# Patient Record
Sex: MALE | Race: WHITE | NOT HISPANIC OR LATINO | URBAN - METROPOLITAN AREA
[De-identification: names, ages, dates, MRNs, and addresses within clinical notes are randomized per-mention and may not be internally consistent; named-entity substitution may affect disease eponyms.]

---

## 2023-08-31 ENCOUNTER — OFFICE (OUTPATIENT)
Dept: URBAN - METROPOLITAN AREA CLINIC 16 | Facility: CLINIC | Age: 33
End: 2023-08-31
Payer: COMMERCIAL

## 2023-08-31 VITALS
WEIGHT: 247 LBS | HEART RATE: 77 BPM | DIASTOLIC BLOOD PRESSURE: 78 MMHG | SYSTOLIC BLOOD PRESSURE: 114 MMHG | HEIGHT: 69 IN | OXYGEN SATURATION: 97 %

## 2023-08-31 DIAGNOSIS — R19.4 CHANGE IN BOWEL HABIT: ICD-10-CM

## 2023-08-31 DIAGNOSIS — K90.0 CELIAC DISEASE: ICD-10-CM

## 2023-08-31 DIAGNOSIS — R10.9 UNSPECIFIED ABDOMINAL PAIN: ICD-10-CM

## 2023-08-31 DIAGNOSIS — Z83.79 FAMILY HISTORY OF OTHER DISEASES OF THE DIGESTIVE SYSTEM: ICD-10-CM

## 2023-08-31 DIAGNOSIS — K62.5 HEMORRHAGE OF ANUS AND RECTUM: ICD-10-CM

## 2023-08-31 PROCEDURE — 99204 OFFICE O/P NEW MOD 45 MIN: CPT | Performed by: INTERNAL MEDICINE

## 2023-08-31 NOTE — SERVICENOTES
Braulio will have TTG and colonoscopy with extended prep

Impressions:
 He has had a change in bowels with even constipation with a family history of Crohn's and ready to have evaluation. Will have colonoscopy with extended prep. He does have a family history of celiac and gluten sensitivity as well and we will check TTG as for completeness
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
## 2023-09-15 LAB — TISSUE TRANSGLUTAMINASE AB, IGA: <4

## 2023-09-28 ENCOUNTER — AMBULATORY SURGICAL CENTER (OUTPATIENT)
Dept: URBAN - METROPOLITAN AREA SURGERY 5 | Facility: SURGERY | Age: 33
End: 2023-09-28
Payer: COMMERCIAL

## 2023-09-28 ENCOUNTER — OFFICE (OUTPATIENT)
Dept: URBAN - METROPOLITAN AREA PATHOLOGY 1 | Facility: PATHOLOGY | Age: 33
End: 2023-09-28
Payer: COMMERCIAL

## 2023-09-28 VITALS
RESPIRATION RATE: 13 BRPM | HEART RATE: 74 BPM | DIASTOLIC BLOOD PRESSURE: 82 MMHG | DIASTOLIC BLOOD PRESSURE: 67 MMHG | SYSTOLIC BLOOD PRESSURE: 118 MMHG | HEART RATE: 72 BPM | DIASTOLIC BLOOD PRESSURE: 86 MMHG | HEIGHT: 69 IN | DIASTOLIC BLOOD PRESSURE: 80 MMHG | RESPIRATION RATE: 17 BRPM | HEART RATE: 65 BPM | SYSTOLIC BLOOD PRESSURE: 119 MMHG | DIASTOLIC BLOOD PRESSURE: 74 MMHG | HEART RATE: 65 BPM | SYSTOLIC BLOOD PRESSURE: 121 MMHG | OXYGEN SATURATION: 97 % | HEART RATE: 72 BPM | SYSTOLIC BLOOD PRESSURE: 105 MMHG | WEIGHT: 235 LBS | SYSTOLIC BLOOD PRESSURE: 119 MMHG | HEART RATE: 74 BPM | DIASTOLIC BLOOD PRESSURE: 82 MMHG | HEART RATE: 70 BPM | DIASTOLIC BLOOD PRESSURE: 73 MMHG | HEART RATE: 62 BPM | SYSTOLIC BLOOD PRESSURE: 112 MMHG | RESPIRATION RATE: 19 BRPM | RESPIRATION RATE: 22 BRPM | DIASTOLIC BLOOD PRESSURE: 80 MMHG | HEART RATE: 62 BPM | RESPIRATION RATE: 13 BRPM | SYSTOLIC BLOOD PRESSURE: 118 MMHG | RESPIRATION RATE: 6 BRPM | DIASTOLIC BLOOD PRESSURE: 62 MMHG | SYSTOLIC BLOOD PRESSURE: 101 MMHG | HEIGHT: 69 IN | RESPIRATION RATE: 22 BRPM | SYSTOLIC BLOOD PRESSURE: 105 MMHG | DIASTOLIC BLOOD PRESSURE: 62 MMHG | DIASTOLIC BLOOD PRESSURE: 74 MMHG | DIASTOLIC BLOOD PRESSURE: 76 MMHG | TEMPERATURE: 97.3 F | HEART RATE: 77 BPM | SYSTOLIC BLOOD PRESSURE: 112 MMHG | OXYGEN SATURATION: 96 % | HEART RATE: 77 BPM | HEART RATE: 63 BPM | OXYGEN SATURATION: 97 % | DIASTOLIC BLOOD PRESSURE: 73 MMHG | OXYGEN SATURATION: 98 % | SYSTOLIC BLOOD PRESSURE: 121 MMHG | WEIGHT: 235 LBS | OXYGEN SATURATION: 98 % | RESPIRATION RATE: 17 BRPM | DIASTOLIC BLOOD PRESSURE: 67 MMHG | HEART RATE: 63 BPM | TEMPERATURE: 97.3 F | DIASTOLIC BLOOD PRESSURE: 86 MMHG | SYSTOLIC BLOOD PRESSURE: 101 MMHG | HEART RATE: 70 BPM | RESPIRATION RATE: 19 BRPM | RESPIRATION RATE: 6 BRPM | OXYGEN SATURATION: 96 % | DIASTOLIC BLOOD PRESSURE: 76 MMHG

## 2023-09-28 DIAGNOSIS — K63.5 POLYP OF COLON: ICD-10-CM

## 2023-09-28 DIAGNOSIS — K64.0 FIRST DEGREE HEMORRHOIDS: ICD-10-CM

## 2023-09-28 DIAGNOSIS — K62.5 HEMORRHAGE OF ANUS AND RECTUM: ICD-10-CM

## 2023-09-28 DIAGNOSIS — R19.4 CHANGE IN BOWEL HABIT: ICD-10-CM

## 2023-09-28 DIAGNOSIS — Z83.71 FAMILY HISTORY OF COLONIC POLYPS: ICD-10-CM

## 2023-09-28 PROCEDURE — 45380 COLONOSCOPY AND BIOPSY: CPT | Performed by: INTERNAL MEDICINE

## 2023-09-28 PROCEDURE — 88305 TISSUE EXAM BY PATHOLOGIST: CPT | Performed by: PATHOLOGY

## 2023-09-28 NOTE — SERVICENOTES
He will follow-up in the office in 6 to 8 weeks and he had examination for change in bowels discomfort in his abdomen family history of colon polyps and concern for family history of Crohn's.  He likely has hyperplastic polyps but exam no later than 10 years

## 2023-09-28 NOTE — SERVICEHPINOTES
He has had a change in bowels with even constipation with a family history of Crohn's and ready to have evaluation. Will have colonoscopy with extended prep.His dad had colon polyps as well

## 2023-10-03 LAB
PDF: PDF REPORT: (no result)
PDF: PDF REPORT: (no result)

## 2023-11-08 ENCOUNTER — OFFICE (OUTPATIENT)
Dept: URBAN - METROPOLITAN AREA CLINIC 16 | Facility: CLINIC | Age: 33
End: 2023-11-08

## 2023-11-08 VITALS — HEIGHT: 69 IN | HEART RATE: 73 BPM | WEIGHT: 250 LBS

## 2023-11-08 DIAGNOSIS — R19.4 CHANGE IN BOWEL HABIT: ICD-10-CM

## 2023-11-08 DIAGNOSIS — Z83.719 FAMILY HISTORY OF COLON POLYPS, UNSPECIFIED: ICD-10-CM

## 2023-11-08 DIAGNOSIS — Z83.79 FAMILY HISTORY OF OTHER DISEASES OF THE DIGESTIVE SYSTEM: ICD-10-CM

## 2023-11-08 PROCEDURE — 99214 OFFICE O/P EST MOD 30 MIN: CPT | Performed by: INTERNAL MEDICINE

## 2023-11-08 NOTE — SERVICEHPINOTES
Frank Powers   is seen today for a follow-up visit.     Braulio was seen in consultation back on August 31, 2023 and does have concern for underlying IBS with change in bowels towards constipation and some alarm concerned with rectal discomfort and rectal bleeding and plan for colonoscopy accordingly with extended prep. Associated with his change in bowels with some generalized discomfort as well he does have posttraumatic stress disorder ADHD migraines and anxiety and sleep apnea to be complicating he had also been on Depakote and venlafaxine in the pastI reviewed the colonoscopy from September 28, 2023 for constipation a family history of Crohn's and his dad had colon polyps as well and the colonoscopy revealed small polyps and plan for colonoscopy no later than 10 years. The ileum was biopsied which was negative for disease accordingly. His polyp specimen showed benign lymphoid aggregate
br
br
brWhen he used MiraLAX he had explosive diarrhea with his irritable bowel. He does have a lot of cramping and I will send in dicyclomine 20 mg 4 times a day. I will send in Trulance to see if this helps with constipation as well

## 2023-11-08 NOTE — SERVICENOTES
Braulio has IBS with constipation and rectal discomfort rectal pain in the setting of posttraumatic stress disorder and anxiety that would suggest underlying IBS issues.  He will have a colonoscopy no later than 10 years and have further treatment for IBS accordingly he had negative biopsies of the ileum.  When he used MiraLAX he had explosive diarrhea with his irritable bowel.  He does have a lot of cramping and I will send in dicyclomine 20 mg 4 times a day.  I will send in Trulance to see if this helps with constipation as well

## 2024-05-28 ENCOUNTER — OFFICE (OUTPATIENT)
Dept: URBAN - METROPOLITAN AREA CLINIC 16 | Facility: CLINIC | Age: 34
End: 2024-05-28
Payer: COMMERCIAL

## 2024-05-28 VITALS
OXYGEN SATURATION: 97 % | HEART RATE: 78 BPM | WEIGHT: 245 LBS | HEIGHT: 69 IN | SYSTOLIC BLOOD PRESSURE: 122 MMHG | DIASTOLIC BLOOD PRESSURE: 82 MMHG

## 2024-05-28 DIAGNOSIS — K58.2 MIXED IRRITABLE BOWEL SYNDROME: ICD-10-CM

## 2024-05-28 DIAGNOSIS — R19.4 CHANGE IN BOWEL HABIT: ICD-10-CM

## 2024-05-28 DIAGNOSIS — Z83.79 FAMILY HISTORY OF OTHER DISEASES OF THE DIGESTIVE SYSTEM: ICD-10-CM

## 2024-05-28 DIAGNOSIS — Z83.719 FAMILY HISTORY OF COLON POLYPS, UNSPECIFIED: ICD-10-CM

## 2024-05-28 PROBLEM — K63.5 POLYP OF COLON: Status: ACTIVE | Noted: 2023-09-28

## 2024-05-28 PROBLEM — Z83.71 FAMILY HISTORY OF COLONIC POLYPS: Status: ACTIVE | Noted: 2023-09-28

## 2024-05-28 PROCEDURE — 99214 OFFICE O/P EST MOD 30 MIN: CPT | Performed by: INTERNAL MEDICINE

## 2024-05-28 NOTE — SERVICEHPINOTES
Frank Powers   is seen today for a follow-up visit.     Braulio was seen in consultation back in August 2023 and has a history of IBS with constipation predominant symptomatology and alarm symptoms with rectal discomfort rectal bleeding and had colonoscopy with extended prep he does have posttraumatic stress disorder ADHD with anxiety and also sleep apnea. The colonoscopy in September 2023 revealed small polyps negative for adenoma with lymphoid aggregate and colonoscopy recommended no later than 10 years the ileum was biopsied and appeared unremarkable as wellRegardless for treatment he had MiraLAX and had diarrhea with irritable bowel and cramping and had to be started on dicyclomine and was recommended for Trulance to see if this helps with constipation as well
br
Pavel had negative biopsies as above but negative blood work on complete evaluation negative evaluation for celiac he does have abdominal cramping and this appears to be related to his IBS and even when he started Depakote he noted this issues with further cramping 
br
brFor the most part he is stable with his issues and will continue current treatment with further dietary modifications and do well visit in 6 months

## 2024-05-28 NOTE — SERVICENOTES
Braulio has posttraumatic stress disorder anxiety stress history and poor response to MiraLAX with diarrhea and cramping and explosion with bowels with constipation history.  He will have medical management for IBS and continuation of Bentyl.Definitely helps we will do further dietary measures to see if this helps with his irritable bowel but for the most part he is doing okay

## 2024-10-31 ENCOUNTER — TRANSCRIBE ORDERS (OUTPATIENT)
Dept: ADMINISTRATIVE | Age: 34
End: 2024-10-31

## 2024-10-31 DIAGNOSIS — G50.0 TRIGEMINAL NEURALGIA: Primary | ICD-10-CM

## 2024-11-15 RX ORDER — GABAPENTIN 100 MG/1
100 CAPSULE ORAL 2 TIMES DAILY
COMMUNITY
Start: 2024-11-06

## 2024-11-15 RX ORDER — ZONISAMIDE 100 MG/1
300 CAPSULE ORAL DAILY
COMMUNITY

## 2024-11-15 RX ORDER — DIVALPROEX SODIUM 500 MG/1
500 TABLET, DELAYED RELEASE ORAL DAILY
COMMUNITY

## 2024-11-15 RX ORDER — FLUTICASONE PROPIONATE 50 MCG
2 SPRAY, SUSPENSION (ML) NASAL DAILY
COMMUNITY

## 2024-11-15 RX ORDER — LISDEXAMFETAMINE DIMESYLATE 30 MG/1
30 CAPSULE ORAL DAILY
COMMUNITY
Start: 2024-03-14

## 2024-11-15 RX ORDER — ALLOPURINOL 100 MG/1
100 TABLET ORAL DAILY
COMMUNITY
Start: 2024-11-14

## 2024-11-15 RX ORDER — VENLAFAXINE 75 MG/1
112.5 TABLET ORAL DAILY
COMMUNITY

## 2024-11-15 RX ORDER — GABAPENTIN 100 MG/1
100 CAPSULE ORAL 3 TIMES DAILY
COMMUNITY
End: 2024-11-15

## 2024-11-15 RX ORDER — DICYCLOMINE HYDROCHLORIDE 10 MG/1
10 CAPSULE ORAL 3 TIMES DAILY PRN
COMMUNITY

## 2024-11-15 RX ORDER — CETIRIZINE HYDROCHLORIDE 10 MG/1
10 TABLET ORAL DAILY
COMMUNITY

## 2024-11-15 RX ORDER — IBUPROFEN 200 MG
200 TABLET ORAL 3 TIMES DAILY PRN
COMMUNITY

## 2024-12-06 ENCOUNTER — PRE-PROCEDURE TELEPHONE (OUTPATIENT)
Dept: RADIATION ONCOLOGY | Age: 34
End: 2024-12-06

## 2024-12-06 NOTE — PROGRESS NOTES
INSTRUCTIONS FOR THE DAY OF GAMMA KNIFE PROCEDURE AT [facility]    1.  Arrive at 6:00 a.m. to register.  2.  DO NOT eat or drink anything after midnight the night before your procedure.  3.  Take all of your usual morning medications the day of the procedure with just a sip of water.  4.  If you are on any blood thinners (Coumadin, Xarelto, Aspirin, Lovenox), you MAY TAKE those medication.  There is no need to stop these medications for your procedure.  5.  If you need pain medication during the night before or the morning of your procedure, you may take them with a sip of water.    6.  Bring a current list of all of your medications with you.  7.  Do not wear any make-up.  8.  Wear comfortable, loose-fitting clothing.  9.  Do not wear jewelry, belts, or any clothing that contains metal.  10.  Visitors will be limited to 1 per patient.    YOU MUST HAVE SOMEONE DRIVE YOU HOME AFTER YOUR PROCEDURE.    Reviewed all pre-procedure instructions with patient via telephone. Answered all questions.    Rachael Hong RN

## 2024-12-09 ENCOUNTER — HOSPITAL ENCOUNTER (OUTPATIENT)
Dept: RADIATION ONCOLOGY | Age: 34
Discharge: HOME | End: 2024-12-09
Attending: NEUROLOGICAL SURGERY

## 2024-12-09 ENCOUNTER — HOSPITAL ENCOUNTER (OUTPATIENT)
Dept: MRI IMAGING | Age: 34
Discharge: HOME | End: 2024-12-09
Attending: NEUROLOGICAL SURGERY
Payer: COMMERCIAL

## 2024-12-09 ENCOUNTER — ANESTHESIA EVENT (OUTPATIENT)
Dept: RADIATION ONCOLOGY | Age: 34
End: 2024-12-09
Payer: COMMERCIAL

## 2024-12-09 ENCOUNTER — ANESTHESIA (OUTPATIENT)
Dept: RADIATION ONCOLOGY | Age: 34
End: 2024-12-09
Payer: COMMERCIAL

## 2024-12-09 VITALS
SYSTOLIC BLOOD PRESSURE: 133 MMHG | BODY MASS INDEX: 33.79 KG/M2 | TEMPERATURE: 97.5 F | DIASTOLIC BLOOD PRESSURE: 92 MMHG | RESPIRATION RATE: 13 BRPM | OXYGEN SATURATION: 100 % | HEIGHT: 70 IN | WEIGHT: 236 LBS | HEART RATE: 69 BPM

## 2024-12-09 DIAGNOSIS — G50.0 TRIGEMINAL NEURALGIA: ICD-10-CM

## 2024-12-09 PROCEDURE — 3700000000 HC ANESTHESIA ATTENDED CARE: Performed by: ANESTHESIOLOGY

## 2024-12-09 PROCEDURE — 2500000003 HC RX 250 WO HCPCS: Performed by: NEUROLOGICAL SURGERY

## 2024-12-09 PROCEDURE — 77334 RADIATION TREATMENT AID(S): CPT

## 2024-12-09 PROCEDURE — 77300 RADIATION THERAPY DOSE PLAN: CPT

## 2024-12-09 PROCEDURE — A9579 GAD-BASE MR CONTRAST NOS,1ML: HCPCS | Performed by: NEUROLOGICAL SURGERY

## 2024-12-09 PROCEDURE — 6360000002 HC RX W HCPCS

## 2024-12-09 PROCEDURE — 77371 SRS MULTISOURCE: CPT

## 2024-12-09 PROCEDURE — 70553 MRI BRAIN STEM W/O & W/DYE: CPT

## 2024-12-09 PROCEDURE — 77295 3-D RADIOTHERAPY PLAN: CPT

## 2024-12-09 PROCEDURE — 6360000002 HC RX W HCPCS: Performed by: NEUROLOGICAL SURGERY

## 2024-12-09 PROCEDURE — 7100000011 HC PHASE II RECOVERY - ADDTL 15 MIN

## 2024-12-09 PROCEDURE — 7100000010 HC PHASE II RECOVERY - FIRST 15 MIN

## 2024-12-09 PROCEDURE — 3700000001 HC ADD 15 MINUTES (ANESTHESIA): Performed by: ANESTHESIOLOGY

## 2024-12-09 PROCEDURE — 2580000003 HC RX 258: Performed by: NEUROLOGICAL SURGERY

## 2024-12-09 PROCEDURE — 6360000004 HC RX CONTRAST MEDICATION: Performed by: NEUROLOGICAL SURGERY

## 2024-12-09 PROCEDURE — 6370000000 HC RX 637 (ALT 250 FOR IP): Performed by: NEUROLOGICAL SURGERY

## 2024-12-09 RX ORDER — ONDANSETRON 2 MG/ML
4 INJECTION INTRAMUSCULAR; INTRAVENOUS EVERY 6 HOURS PRN
Status: DISCONTINUED | OUTPATIENT
Start: 2024-12-09 | End: 2024-12-10 | Stop reason: HOSPADM

## 2024-12-09 RX ORDER — LIDOCAINE HYDROCHLORIDE 20 MG/ML
INJECTION, SOLUTION EPIDURAL; INFILTRATION; INTRACAUDAL; PERINEURAL
Status: DISCONTINUED | OUTPATIENT
Start: 2024-12-09 | End: 2024-12-09 | Stop reason: SDUPTHER

## 2024-12-09 RX ORDER — LORAZEPAM 2 MG/ML
INJECTION INTRAMUSCULAR
Status: COMPLETED
Start: 2024-12-09 | End: 2024-12-09

## 2024-12-09 RX ORDER — SODIUM BICARBONATE 42 MG/ML
1.5 INJECTION, SOLUTION INTRAVENOUS ONCE
Status: COMPLETED | OUTPATIENT
Start: 2024-12-09 | End: 2024-12-09

## 2024-12-09 RX ORDER — 0.9 % SODIUM CHLORIDE 0.9 %
500 INTRAVENOUS SOLUTION INTRAVENOUS ONCE
Status: COMPLETED | OUTPATIENT
Start: 2024-12-09 | End: 2024-12-09

## 2024-12-09 RX ORDER — BUPIVACAINE HYDROCHLORIDE 5 MG/ML
30 INJECTION, SOLUTION EPIDURAL; INTRACAUDAL ONCE
Status: COMPLETED | OUTPATIENT
Start: 2024-12-09 | End: 2024-12-09

## 2024-12-09 RX ORDER — NEOMYCIN/BACITRACIN/POLYMYXINB 3.5-400-5K
OINTMENT (GRAM) TOPICAL 2 TIMES DAILY
Status: DISCONTINUED | OUTPATIENT
Start: 2024-12-09 | End: 2024-12-10 | Stop reason: HOSPADM

## 2024-12-09 RX ORDER — DEXAMETHASONE SODIUM PHOSPHATE 4 MG/ML
4 INJECTION, SOLUTION INTRA-ARTICULAR; INTRALESIONAL; INTRAMUSCULAR; INTRAVENOUS; SOFT TISSUE ONCE
Status: COMPLETED | OUTPATIENT
Start: 2024-12-09 | End: 2024-12-09

## 2024-12-09 RX ORDER — LORAZEPAM 2 MG/ML
1 INJECTION INTRAMUSCULAR ONCE
Status: COMPLETED | OUTPATIENT
Start: 2024-12-09 | End: 2024-12-09

## 2024-12-09 RX ORDER — PROPOFOL 10 MG/ML
INJECTION, EMULSION INTRAVENOUS
Status: DISCONTINUED | OUTPATIENT
Start: 2024-12-09 | End: 2024-12-09 | Stop reason: SDUPTHER

## 2024-12-09 RX ORDER — ACETAMINOPHEN 325 MG/1
650 TABLET ORAL EVERY 4 HOURS PRN
Status: DISCONTINUED | OUTPATIENT
Start: 2024-12-09 | End: 2024-12-10 | Stop reason: HOSPADM

## 2024-12-09 RX ADMIN — LIDOCAINE HYDROCHLORIDE 100 MG: 20 INJECTION, SOLUTION EPIDURAL; INFILTRATION; INTRACAUDAL; PERINEURAL at 09:03

## 2024-12-09 RX ADMIN — LIDOCAINE HYDROCHLORIDE 30 ML: 10 INJECTION, SOLUTION EPIDURAL; INFILTRATION; INTRACAUDAL; PERINEURAL at 09:21

## 2024-12-09 RX ADMIN — PROPOFOL 70 MG: 10 INJECTION, EMULSION INTRAVENOUS at 09:03

## 2024-12-09 RX ADMIN — LORAZEPAM 1 MG: 2 INJECTION INTRAMUSCULAR; INTRAVENOUS at 13:09

## 2024-12-09 RX ADMIN — PROPOFOL 40 MG: 10 INJECTION, EMULSION INTRAVENOUS at 09:12

## 2024-12-09 RX ADMIN — LORAZEPAM 1 MG: 2 INJECTION INTRAMUSCULAR at 13:09

## 2024-12-09 RX ADMIN — ACETAMINOPHEN 650 MG: 325 TABLET ORAL at 14:01

## 2024-12-09 RX ADMIN — ONDANSETRON 4 MG: 2 INJECTION INTRAMUSCULAR; INTRAVENOUS at 08:56

## 2024-12-09 RX ADMIN — LIDOCAINE HYDROCHLORIDE 30 ML: 10 INJECTION, SOLUTION EPIDURAL; INFILTRATION; INTRACAUDAL; PERINEURAL at 11:32

## 2024-12-09 RX ADMIN — ACETAMINOPHEN 650 MG: 325 TABLET ORAL at 09:56

## 2024-12-09 RX ADMIN — PROPOFOL 50 MG: 10 INJECTION, EMULSION INTRAVENOUS at 09:05

## 2024-12-09 RX ADMIN — SODIUM BICARBONATE 1.5 MEQ: 42 INJECTION, SOLUTION INTRAVENOUS at 09:21

## 2024-12-09 RX ADMIN — GADOTERIDOL 20 ML: 279.3 INJECTION, SOLUTION INTRAVENOUS at 07:13

## 2024-12-09 RX ADMIN — DEXAMETHASONE SODIUM PHOSPHATE 4 MG: 4 INJECTION INTRA-ARTICULAR; INTRALESIONAL; INTRAMUSCULAR; INTRAVENOUS; SOFT TISSUE at 09:21

## 2024-12-09 RX ADMIN — SODIUM CHLORIDE 500 ML: 900 INJECTION, SOLUTION INTRAVENOUS at 09:22

## 2024-12-09 RX ADMIN — BUPIVACAINE HYDROCHLORIDE 150 MG: 5 INJECTION, SOLUTION EPIDURAL; INTRACAUDAL; PERINEURAL at 09:20

## 2024-12-09 RX ADMIN — PROPOFOL 40 MG: 10 INJECTION, EMULSION INTRAVENOUS at 09:08

## 2024-12-09 ASSESSMENT — PAIN DESCRIPTION - ORIENTATION
ORIENTATION: ANTERIOR
ORIENTATION: ANTERIOR

## 2024-12-09 ASSESSMENT — PAIN DESCRIPTION - DESCRIPTORS
DESCRIPTORS: ACHING
DESCRIPTORS: ACHING

## 2024-12-09 ASSESSMENT — PAIN SCALES - GENERAL: PAINLEVEL_OUTOF10: 2

## 2024-12-09 ASSESSMENT — PAIN DESCRIPTION - LOCATION
LOCATION: HEAD
LOCATION: HEAD

## 2024-12-09 NOTE — PROGRESS NOTES
Gamma Knife Radiation Delivery Time Out    1.  Patient states name and birthdate correctly? Yes    2.  Procedure listed on consent Stereotactic Radiosurgery, Gamma Knife for RIGHT trigeminal neuralgia    3. Is this the correct procedure? Yes    4. Is this a trigeminal neuralgia case? Yes    -If yes, what side are we treating? right    -If yes, is the side marked for laterality?  yes    5.  Has the final radiologist report been reviewed? yes    6.  Does the patient require Keppra prior to treatment delivery? no    7.  Does the patient require Ativan prior to treatment delivery?  no    8.  Are all present in agreement?  Yes    9. Those present for time out:  Dr. Elicia Loaiza, Isa Hilton, FRANCIS Hilton RN

## 2024-12-09 NOTE — PROGRESS NOTES
After Gamma Knife procedure, the G-frame was removed by FRANCIS Moss and FRANCIS Ryan and fixation pin sites were observed for bleeding. None was noted.  Pin sites were cleaned with alcohol and povidone-iodine.  Dr. Encinas then placed sutures at all four pin sites.     Patient met Phase II discharge criteria.  Baseline neurological status unchanged.  See discharge Adriana score and vitals.    Discharge instructions were reviewed with patient and spouse who verbalized understanding using teach back method. A written copy of the instructions was given. Patient has follow up appointments scheduled.    Patient was accompanied to transportation by this RN.    Isa Hilton RN

## 2024-12-09 NOTE — PROGRESS NOTES
Gamma Knife Frame Placement Time Out     1.  Patient states name and birthdate correctly? Yes    2. Procedure listed on consent:  G-Frame placement and Gamma Knife radiosurgery for RIGHT trigeminal neuralgia    3.  Is this the correct procedure?  Yes    4.  Are the consents signed?  Yes    5. Is this a trigeminal neuralgia case? Yes    -If yes, what side are we treating? right    -If yes, is the side marked for laterality?  yes    6.  Have films been reviewed today?  Yes    7.  Has the interim History and Physical form been completed? yes    8.  Has the neurosurgeon reviewed the Gamma Knife RN Pre-Procedure Checklist?  Yes    9.  FEMALES ONLY: Does the patient require a pregnancy test per [place] policy? N/A     -If yes, the result was:  na    10.  Are all present in agreement?  Yes    Those present for time out:  Shelby Webber, Dr. Encinas, Sangita Webber RN

## 2024-12-09 NOTE — PROGRESS NOTES
Patient arrived to Gamma Knife department alert and oriented x 4 with spouse, Tanya.   Patient is neurologically intact.   PIV established without difficulty.  Initial vitals WNL, and patient denies pain.    KPS: 100    ECO    mFI-5:  0    Isa Hilton RN    Gamma Knife RN Pre-Procedure Checklist     1. Has the patient ever had any surgery on the head or neck?  No    -If yes, is the surgery site marked?  N/A    2. Has the patient ever received radiation treatment to the head or neck? No      -If yes, is the treatment summary and/or disk of treatment plan available? N/A      -If the patient has had previous Gamma Knife radiosurgery has the most recent imaging been reviewed in the Gamma Plan? N\/A      3. Has the patient received chemotherapy or immunotherapy in the past month? No      -If yes, list the agent and date of last treatment.  N/A    4. Is patient 80 or older? No      -If yes, using all aluminum pins? N/A    5. List the procedure-specific medications taken by the patient this morning:   -Gabapentin 100 mg   -Depakote 500 mg   -Zonegran 100 mg    6. What is the patient’s GFR? 74    -For GFR 0-30 => no contrast at MRI    -For GFR 31-59 => single dose contrast at MRI    -For GFR >60 => double dose contrast at MRI    7. IF FEMALE: Does the patient require a pregnancy test per [facility] policy?   N/A   -If yes, the result is: na    8. What is the patient's baseline CO2? 33

## 2024-12-09 NOTE — PROGRESS NOTES
Gamma Knife Procedure Note    Name: Can Valero  : 1990    Diagnosis: Right Trigeminal Neuralgia    Procedure: Gamma Knife Stereotactic Radiosurgery (SRS)    Description of Procedure  Patient underwent conscious sedation/stereotactic ring placement per Dr. Encinas. Patient then completed stereotactic MRI including T1 and CISS sequences, both pre- and post-contrast. Treatment planning was then completed and is summarized as follows:    Right trigeminal nerve: Dose  86  Gy defined at Dmax. A single 4 mm shot with sector blocking was employed. Attention was given to limiting dose to the rightward brainstem and right mesial temporal lobe.    Treatment Course  Successful completion of SRS.    Post Treatment Planning  Per Dr. Encinas.    Freddy Rubi MD

## 2024-12-09 NOTE — H&P
The patient was seen and examined. There have been no changes in the patient's condition since the history and physical.    I reviewed the benefits, risks, and alternatives and the patient consents to the procedure.    A CT marker was placed on the side of pain (RIGHT).     Jeremiah Encinas MD

## 2024-12-09 NOTE — OP NOTE
physicist. All critical structure constraints were fulfilled.     A cone-beam CT scan was performed in the treatment position to confirm set-up accuracy. The patient then underwent Gamma Knife radiosurgery using the following parameters:    Target Shot Blocks Dose  Isodose  Beam Time Brainstem Brainstem Temporal      # Gy % Min  10 mm3  Max 10 mm3   RIGHT trigeminal 4 mm 2 86 100 63.32 13.5 18.4 19.0     Integral dose of trigeminal nerve enclosed by the 50% isodose line = 2.6 mJ    The patient tolerated the procedure without difficulty and the stereotactic frame was removed uneventfully. The pin sites were prepped and closed with Rapide Vicryl. The patient was instructed on pin site care and medications.    SPECIMENS REMOVED: None    ESTIMATED BLOOD LOSS: None    TOTAL TIME SPENT WITH PATIENT: In conformance with CMS regulations, I affirm that I was present for the entire neurosurgical portion of the procedure including stereotactic frame placement, target definition, development of the treatment plan, treatment delivery, and stereotactic frame removal.     Jeremiah Encinas MD

## 2024-12-09 NOTE — PROGRESS NOTES
Patient received MAC under the supervision of Dr. Stuart and Sangita Chaparro CRNA.  This RN was present throughout MAC and assumed care from anesthesia for Phase II recovery.      See Flow Sheet for vitals and Adriana Score.    Shelby Webber RN

## 2024-12-09 NOTE — PROGRESS NOTES
Patient brought to Gamma Knife suite and placed on treatment couch. Patient instructed to perform ankle pumps during treatment. AV monitoring in place and verified verbally with patient from console. Continuous SpO2 and pulse monitor visible and monitored by this RN.

## 2024-12-09 NOTE — ANESTHESIA PRE PROCEDURE
Department of Anesthesiology  Preprocedure Note       Name:  Can Valero   Age:  34 y.o.  :  1990                                          MRN:  [id number]         Date:  2024      Surgeon: * No surgeons listed *    Procedure: * No procedures listed *    Medications prior to admission:   Prior to Admission medications    Medication Sig Start Date End Date Taking? Authorizing Provider   divalproex (DEPAKOTE) 500 MG DR tablet Take 1 tablet by mouth daily   Yes Jayant Anaya MD   zonisamide (ZONEGRAN) 100 MG capsule Take 3 capsules by mouth daily   Yes ProviderJayant MD   Fremanezumab-vfrm 225 MG/1.5ML SOAJ Inject into the skin   Yes ProviderJayant MD   allopurinol (ZYLOPRIM) 100 MG tablet Take 1 tablet by mouth daily 24  Yes Jayant Anaya MD   cetirizine (ZYRTEC) 10 MG tablet Take 1 tablet by mouth daily   Yes Jayant Anaya MD   dicyclomine (BENTYL) 10 MG capsule Take 1 capsule by mouth Three times daily as needed   Yes Provider, MD Jayant   fluticasone (FLONASE) 50 MCG/ACT nasal spray 2 sprays by Nasal route daily   Yes Provider, MD Jayant   ibuprofen (ADVIL;MOTRIN) 200 MG tablet Take 1 tablet by mouth Three times daily as needed   Yes ProviderJayant MD   lisdexamfetamine (VYVANSE) 30 MG capsule Take 1 capsule by mouth daily. 3/14/24  Yes Jayant Anaya MD   venlafaxine (EFFEXOR) 75 MG tablet Take 1.5 tablets by mouth daily   Yes ProviderJayant MD   gabapentin (NEURONTIN) 100 MG capsule Take 1 capsule by mouth 2 times daily. 24  Yes Jayant Anaya MD       Current medications:    Current Outpatient Medications   Medication Sig Dispense Refill   • divalproex (DEPAKOTE) 500 MG DR tablet Take 1 tablet by mouth daily     • zonisamide (ZONEGRAN) 100 MG capsule Take 3 capsules by mouth daily     • Fremanezumab-vfrm 225 MG/1.5ML SOAJ Inject into the skin     • allopurinol (ZYLOPRIM) 100 MG tablet Take 1 tablet by mouth daily

## 2024-12-09 NOTE — DISCHARGE INSTRUCTIONS
GAMMA KNIFE POST-PROCEDURE INSTRUCTIONS    You may gently wash your hair and face two days after your procedure.  Be gentle and do not rub the pin sites.  Pat areas dry.  WHAT TO DO IF YOU EXPERIENCE BLEEDING AT THE PIN SITES:  Remove any steri strips or band-aids (if present) that are in place at the site that is bleeding.  Using the gauze pads given to you by the Gamma Knife RN, apply direct, one finger pressure continuously for 5 minutes.  After 5 minutes inspect the site for bleeding.  If the bleeding continues, repeat holding pressure, this time for 10 minutes.  After 10 minutes, inspect the site again.  If bleeding continues, proceed to the nearest emergency room for placement of a suture (stich).   If you have sutures (stitches), you may shower with them, but keep them clean and dry otherwise.  They usually dissolve on their own in 2-3 weeks.  You have undergone a procedure with sedation. You have been given medicines that affect your judgment or impair your ability to operate heavy machines and automobiles. You may not drive for 24 hours after your procedure.  Please see the attached information regarding Sedation.  Avoid hair spray, mousse, gels, hair color, permanent solutions, or any other products that could cause irritation until your pin sites heal.  You may notice blood-tinged water when washing your hair.  This may be dried blood in your hair from the application of the head frame.  Swelling above the eyes may occur within 48 hours of surgery.    You may apply cool compresses to your forehead if swelling occurs.  Some patients experiences tingling or temporary numbness above the pin sites.  You may return to work or school after 24 hours if you feel well enough.  You should resume all of your regular activities unless the physician has instructed you otherwise.  You may resume your usual diet right away.  If you are feeling nauseated, start with a bland diet.  If you develop any signs of infection

## 2024-12-10 ENCOUNTER — POST-OP TELEPHONE (OUTPATIENT)
Dept: RADIATION ONCOLOGY | Age: 34
End: 2024-12-10

## 2024-12-10 NOTE — PROGRESS NOTES
Spoke to patient POD #1 from Gamma Knife radiosurgery.  Patient denies bleeding, swelling, headache, or fever.  Reinforced all post-procedure instructions.     Reviewed with patient that the follow-up phone call is scheduled for 12/23.     Patient verbalized understanding to call with any new neurological symptoms.     Encouraged to call with any questions or concerns.

## 2024-12-20 NOTE — ANESTHESIA POSTPROCEDURE EVALUATION
Department of Anesthesiology  Postprocedure Note    Patient: Can Valero  MRN: [id number]  YOB: 1990  Date of evaluation: 12/19/2024    Procedure Summary       Date: 12/09/24 Room / Location: [facility] Gamma Knife    Anesthesia Start: 0858 Anesthesia Stop: 0917    Procedure: GAMMAKNIFE W ANESTHESIA Diagnosis:     Scheduled Providers: Theodore Stuart MD Responsible Provider: Theodore Stuart MD    Anesthesia Type: MAC ASA Status: 2            Anesthesia Type: No value filed.    Adriana Phase I: Adriana Score: 10    Adriana Phase II:      Anesthesia Post Evaluation    Patient location during evaluation: PACU  Patient participation: complete - patient participated  Level of consciousness: awake  Airway patency: patent  Nausea & Vomiting: no nausea and no vomiting  Cardiovascular status: blood pressure returned to baseline and hemodynamically stable  Respiratory status: acceptable  Hydration status: euvolemic  Multimodal analgesia pain management approach  Pain management: adequate    No notable events documented.

## 2024-12-23 ENCOUNTER — TELEPHONE (OUTPATIENT)
Dept: RADIATION ONCOLOGY | Age: 34
End: 2024-12-23

## 2024-12-23 NOTE — TELEPHONE ENCOUNTER
Two week follow-up phone call with patient has been completed.   Pt denies any issues with the pin sites, states that the sutures have almost dissolved.    Pt's trigeminal neuralgia has been more intermittent than before.  3.   Has been reminded of their next appointment with Dr. Encinas's office via phone in 2 weeks.  4.   Any other questions or further follow up will be with Dr. Encinas.    Thank you for referring this patient to the Gamma Knife Department for treatment, it has been a pleasure to participate in their care.  If the patient requires future Gamma Knife procedures please contact the department directly at [phone].

## 2025-07-15 ENCOUNTER — APPOINTMENT (OUTPATIENT)
Dept: URBAN - METROPOLITAN AREA CLINIC 205 | Age: 35
Setting detail: DERMATOLOGY
End: 2025-07-17

## 2025-07-15 DIAGNOSIS — L30.9 DERMATITIS, UNSPECIFIED: ICD-10-CM

## 2025-07-15 PROCEDURE — OTHER PRESCRIPTION: OTHER

## 2025-07-15 PROCEDURE — OTHER COUNSELING: OTHER

## 2025-07-15 PROCEDURE — OTHER ORDER TESTS: OTHER

## 2025-07-15 PROCEDURE — OTHER COUNSELING: TOPICAL STEROIDS: OTHER

## 2025-07-15 PROCEDURE — OTHER TREATMENT REGIMEN: OTHER

## 2025-07-15 RX ORDER — TRIAMCINOLONE ACETONIDE 1 MG/G
CREAM TOPICAL
Qty: 80 | Refills: 0 | Status: ERX | COMMUNITY
Start: 2025-07-15

## 2025-07-15 ASSESSMENT — BSA RASH: BSA RASH: 20

## 2025-07-15 ASSESSMENT — ITCH NUMERIC RATING SCALE: HOW SEVERE IS YOUR ITCHING?: 2

## 2025-07-15 ASSESSMENT — LOCATION ZONE DERM: LOCATION ZONE: LEG

## 2025-07-15 ASSESSMENT — SEVERITY ASSESSMENT: SEVERITY: MILD

## 2025-07-15 ASSESSMENT — LOCATION SIMPLE DESCRIPTION DERM: LOCATION SIMPLE: LEFT THIGH

## 2025-07-15 ASSESSMENT — LOCATION DETAILED DESCRIPTION DERM: LOCATION DETAILED: LEFT ANTERIOR PROXIMAL THIGH

## 2025-07-17 ENCOUNTER — RX ONLY (RX ONLY)
Age: 35
End: 2025-07-17

## 2025-07-17 RX ORDER — MUPIROCIN 20 MG/G
OINTMENT TOPICAL
Qty: 22 | Refills: 0 | Status: ERX | COMMUNITY
Start: 2025-07-17

## 2025-07-17 RX ORDER — DOXYCYCLINE HYCLATE 100 MG/1
CAPSULE, GELATIN COATED ORAL
Qty: 28 | Refills: 0 | Status: ERX | COMMUNITY
Start: 2025-07-17

## 2025-07-31 ENCOUNTER — RX ONLY (RX ONLY)
Age: 35
End: 2025-07-31

## 2025-07-31 RX ORDER — TRIAMCINOLONE ACETONIDE 1 MG/G
CREAM TOPICAL
Qty: 80 | Refills: 0 | Status: ERX

## 2025-08-04 ENCOUNTER — APPOINTMENT (OUTPATIENT)
Dept: URBAN - METROPOLITAN AREA CLINIC 205 | Age: 35
Setting detail: DERMATOLOGY
End: 2025-08-04

## 2025-08-04 DIAGNOSIS — L30.9 DERMATITIS, UNSPECIFIED: ICD-10-CM
